# Patient Record
Sex: FEMALE | Race: WHITE | NOT HISPANIC OR LATINO | URBAN - METROPOLITAN AREA
[De-identification: names, ages, dates, MRNs, and addresses within clinical notes are randomized per-mention and may not be internally consistent; named-entity substitution may affect disease eponyms.]

---

## 2021-04-15 DIAGNOSIS — Z23 ENCOUNTER FOR IMMUNIZATION: ICD-10-CM

## 2025-06-29 ENCOUNTER — HOSPITAL ENCOUNTER (OUTPATIENT)
Facility: CLINIC | Age: 75
Discharge: HOME | End: 2025-06-29
Attending: FAMILY MEDICINE
Payer: COMMERCIAL

## 2025-06-29 VITALS
DIASTOLIC BLOOD PRESSURE: 69 MMHG | HEART RATE: 102 BPM | OXYGEN SATURATION: 97 % | TEMPERATURE: 97.5 F | SYSTOLIC BLOOD PRESSURE: 164 MMHG | RESPIRATION RATE: 16 BRPM
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
## 2025-06-29 PROCEDURE — 99203 OFFICE O/P NEW LOW 30 MIN: CPT | Mod: 25 | Performed by: NURSE PRACTITIONER

## 2025-06-29 PROCEDURE — 90471 IMMUNIZATION ADMIN: CPT | Performed by: FAMILY MEDICINE

## 2025-06-29 PROCEDURE — 90715 TDAP VACCINE 7 YRS/> IM: CPT | Performed by: FAMILY MEDICINE

## 2025-06-29 RX ORDER — HYDROCHLOROTHIAZIDE 12.5 MG/1
12.5 TABLET ORAL
COMMUNITY
Start: 2025-06-25

## 2025-06-29 RX ORDER — METFORMIN HYDROCHLORIDE 500 MG/1
500 TABLET, EXTENDED RELEASE ORAL
COMMUNITY

## 2025-06-29 RX ORDER — METRONIDAZOLE 500 MG/1
500 TABLET ORAL 2 TIMES DAILY
Qty: 6 TABLET | Refills: 0 | Status: SHIPPED | OUTPATIENT
Start: 2025-06-29 | End: 2025-07-02

## 2025-06-29 RX ORDER — FLUTICASONE FUROATE 100 UG/1
1 POWDER RESPIRATORY (INHALATION)
COMMUNITY

## 2025-06-29 RX ORDER — DOXYCYCLINE 100 MG/1
100 CAPSULE ORAL 2 TIMES DAILY
Qty: 10 CAPSULE | Refills: 0 | Status: SHIPPED | OUTPATIENT
Start: 2025-06-29 | End: 2025-07-04

## 2025-06-29 RX ORDER — OLMESARTAN MEDOXOMIL 20 MG/1
TABLET ORAL
COMMUNITY
Start: 2025-04-13

## 2025-06-29 RX ORDER — HYDROGEN PEROXIDE 3 %
SOLUTION, NON-ORAL MISCELLANEOUS
COMMUNITY

## 2025-06-29 RX ORDER — ASPIRIN 81 MG/1
81 TABLET ORAL
COMMUNITY

## 2025-06-29 RX ORDER — ALBUTEROL SULFATE 90 UG/1
INHALANT RESPIRATORY (INHALATION) EVERY 4 HOURS
COMMUNITY

## 2025-06-29 RX ORDER — CLINDAMYCIN HYDROCHLORIDE 300 MG/1
300 CAPSULE ORAL
COMMUNITY
Start: 2024-12-24 | End: 2025-06-29 | Stop reason: ALTCHOICE

## 2025-06-29 ASSESSMENT — ENCOUNTER SYMPTOMS
JOINT SWELLING: 0
ARTHRALGIAS: 0
BACK PAIN: 0
NECK STIFFNESS: 0
ADENOPATHY: 0
NECK PAIN: 0
LIGHT-HEADEDNESS: 0
WOUND: 1
MYALGIAS: 0
NUMBNESS: 0
VOMITING: 0
COUGH: 0
SINUS PRESSURE: 0
HEADACHES: 0
FEVER: 0
RHINORRHEA: 0
SINUS PAIN: 0
WEAKNESS: 0
SORE THROAT: 0
DIARRHEA: 0
NAUSEA: 0
SHORTNESS OF BREATH: 0
DIZZINESS: 0
ABDOMINAL PAIN: 0
CHILLS: 0

## 2025-06-29 NOTE — DISCHARGE INSTRUCTIONS
Take doxycycline and flagyl twice a day for 3 days.  Be sure to clean wound daily with mild soap and water.  Apply neosporin and wrap with bandage.    Follow-up with your primary care provider for wound re-check.  If you develop signs and symptoms of infection including redness, drainage, or swelling, please visit your local emergency department.    Thank you for choosing [facility]!

## 2025-06-29 NOTE — ED PROVIDER NOTES
Emergency Medicine Note  HPI   HISTORY OF PRESENT ILLNESS     76 y/o female presents to  c/o left hand wound. Reports her 3.5 month old chocolate lab accidentally scratched her hand with her tooth 2 days ago during a routine vet visit. Notes dog is UTD on vaccines and did not bit her. Denies fever, chills, numbness/tingling, weakness, or any other injury/pain. TDAP not UTD. Pt offers no other complaints currently.             Patient History   PAST HISTORY     Reviewed from Nursing Triage:       No past medical history on file.    No past surgical history on file.    No family history on file.           Review of Systems   REVIEW OF SYSTEMS     Review of Systems   Constitutional:  Negative for chills and fever.   HENT:  Negative for congestion, rhinorrhea, sinus pressure, sinus pain and sore throat.    Respiratory:  Negative for cough and shortness of breath.    Cardiovascular:  Negative for chest pain.   Gastrointestinal:  Negative for abdominal pain, diarrhea, nausea and vomiting.   Musculoskeletal:  Negative for arthralgias, back pain, joint swelling, myalgias, neck pain and neck stiffness.   Skin:  Positive for wound. Negative for rash.   Neurological:  Negative for dizziness, weakness, light-headedness, numbness and headaches.   Hematological:  Negative for adenopathy.   All other systems reviewed and are negative.        VITALS     ED Vitals      Date/Time Temp Pulse Resp BP SpO2 [facility]   06/29/25 0829 36.4 °C (97.5 °F) 102 16 164/69 97 % LLB                         Physical Exam   PHYSICAL EXAM     Physical Exam  Vitals reviewed.   Constitutional:       Appearance: She is well-developed. She is not ill-appearing or toxic-appearing.   HENT:      Head: Normocephalic and atraumatic.   Cardiovascular:      Rate and Rhythm: Normal rate.   Pulmonary:      Effort: Pulmonary effort is normal.   Musculoskeletal:        Hands:    Skin:     General: Skin is warm and dry.   Neurological:      Mental Status: She is alert  and oriented to person, place, and time.   Psychiatric:         Behavior: Behavior is cooperative.         Thought Content: Thought content normal.        Media Information        PROCEDURES     Procedures     DATA     Results       None                No orders to display       Scoring tools                                  ED Course & MDM   MDM / ED COURSE / CLINICAL IMPRESSION / DISPO     Medical Decision Making  # Dog scratch with tooth  - Patient appears well. NAD. VSS. Uninfected wound, no crush injury, NVI.   - Wound cleaned with wound cleanser, abx ointment and nonadherent dressing applied, wrapped with dejah.  - Rx doxy and flagyl ppx (PCN allergy). Discussed SE profile   - TDAP given today   - Discussed wound care at length   - Recc f/u with PCP within 3-5 days for re-eval  - Discussed red flag s/s for which immediate ER eval is recc. Advised any worsening/concerning sx develop, pt is to go to the ED     Problems Addressed:  Dog scratch: acute illness or injury           Clinical Impression      Dog scratch - scratched skin with teeth accidentally     _________________       ED Disposition   Discharge                       Amelia Lowe CRNP  06/29/25 0816

## 2025-06-29 NOTE — ED ATTESTATION NOTE
Patient was seen by the NP at another [facility] urgent care location.  I was available for consultation via telephone only.     Neha Yates DO  06/29/25 0975